# Patient Record
Sex: FEMALE | Race: OTHER | URBAN - METROPOLITAN AREA
[De-identification: names, ages, dates, MRNs, and addresses within clinical notes are randomized per-mention and may not be internally consistent; named-entity substitution may affect disease eponyms.]

---

## 2022-10-12 ENCOUNTER — HOSPITAL ENCOUNTER (OUTPATIENT)
Dept: LAB | Age: 27
Discharge: HOME | End: 2022-10-12

## 2022-10-12 ENCOUNTER — OFFICE VISIT (OUTPATIENT)
Dept: FAMILY MEDICINE CLINIC | Age: 27
End: 2022-10-12

## 2022-10-12 VITALS
WEIGHT: 114 LBS | BODY MASS INDEX: 20.2 KG/M2 | OXYGEN SATURATION: 99 % | HEIGHT: 63 IN | DIASTOLIC BLOOD PRESSURE: 75 MMHG | TEMPERATURE: 98.1 F | SYSTOLIC BLOOD PRESSURE: 115 MMHG | HEART RATE: 72 BPM

## 2022-10-12 DIAGNOSIS — R00.2 PALPITATIONS: Primary | ICD-10-CM

## 2022-10-12 DIAGNOSIS — F41.9 ANXIETY-LIKE SYMPTOMS: ICD-10-CM

## 2022-10-12 DIAGNOSIS — R00.2 PALPITATIONS: ICD-10-CM

## 2022-10-12 PROCEDURE — 84443 ASSAY THYROID STIM HORMONE: CPT

## 2022-10-12 PROCEDURE — 80048 BASIC METABOLIC PNL TOTAL CA: CPT

## 2022-10-12 PROCEDURE — 99203 OFFICE O/P NEW LOW 30 MIN: CPT | Performed by: PHYSICIAN ASSISTANT

## 2022-10-12 PROCEDURE — 36415 COLL VENOUS BLD VENIPUNCTURE: CPT

## 2022-10-12 PROCEDURE — 84439 ASSAY OF FREE THYROXINE: CPT

## 2022-10-12 RX ORDER — HYDROXYZINE PAMOATE 25 MG/1
25 CAPSULE ORAL
Qty: 30 CAPSULE | Refills: 0 | Status: SHIPPED | OUTPATIENT
Start: 2022-10-12 | End: 2022-10-26

## 2022-10-12 NOTE — PROGRESS NOTES
An After Visit Summary was printed and given to the patient. Instructed patient on how to administer medication per provider order. GoodRx provided to patient for needed medications. Instructed patient on how to use the coupon/card. Patient given a lab requisition to have labs drawn in [address].  Patient signed a KARLIE so provider can have access to patients PHI from [facility]

## 2022-10-12 NOTE — PROGRESS NOTES
Assessment/Plan:    Diagnoses and all orders for this visit:    1. Palpitations  -     TSH 3RD GENERATION; Future  -     T4, FREE; Future  -     METABOLIC PANEL, COMPREHENSIVE; Future    2. Anxiety-like symptoms  -     hydrOXYzine pamoate (VISTARIL) 25 mg capsule; Take 1 Capsule by mouth three (3) times daily as needed for Anxiety for up to 14 days. Record release [facility] ER end of 9/22  Labs  AVS  New med     Follow-up and Dispositions    Return in about 2 weeks (around 10/26/2022) for appt with me f2f please or vv if possible . Deanna Barry PA-C  [facility] expressed understanding of this plan. An AVS was printed and given to the patient.      ----------------------------------------------------------------------    Chief Complaint   Patient presents with    Palpitations     X 2 weeks       History of Present Illness:    New pt presents for 2 weeks of racing heart and high bp  She reports that she went to ER at [facility] about a week ago and they did EKG and labs and everything was normal  She reports no change in her psychosocial life in the last few months. She is a single mom of a 10 yo daughter. She is  from [place]. She is from [place] and her parents are there and this is hard but not a change. Her long term plan is to return to [place] in about 5 years  She has the implanon device and is not pregnant  She reports feeling like her heart is racing and difficult to breath about 2-5 times a day for the past 2 weeks. The walls feel like that they are closing in on her. She has to calm herself by breathing slowly. The sxs last only minutes and then resolve  She is sleeping well  She is not losing or gaining weight  Per her hx she does use recreational marijuana on a weekly basis     No past medical history on file.     Current Outpatient Medications   Medication Sig Dispense Refill    hydrOXYzine pamoate (VISTARIL) 25 mg capsule Take 1 Capsule by mouth three (3) times daily as needed for Anxiety for up to 14 days. 30 Capsule 0       No Known Allergies    Social History     Tobacco Use    Smoking status: Never    Smokeless tobacco: Never   Substance Use Topics    Alcohol use: Yes     Comment: social    Drug use: Yes     Frequency: 1.0 times per week     Types: Marijuana       No family history on file.     Physical Exam:     Visit Vitals  /75   Pulse 72   Temp 98.1 °F (36.7 °C) (Temporal)   Ht 5' 2.99\" (1.6 m)   Wt 114 lb (51.7 kg)   SpO2 99%   BMI 20.20 kg/m²       A&Ox3  WDWN NAD  Respirations normal and non labored  Cor RRR s1s2  Lungs CTA Danyel  Thyroid not enlarged

## 2022-10-13 LAB
ANION GAP SERPL CALC-SCNC: 7 MMOL/L (ref 5–15)
BUN SERPL-MCNC: 10 MG/DL (ref 6–20)
BUN/CREAT SERPL: 14 (ref 12–20)
CALCIUM SERPL-MCNC: 9.3 MG/DL (ref 8.5–10.1)
CHLORIDE SERPL-SCNC: 105 MMOL/L (ref 97–108)
CO2 SERPL-SCNC: 27 MMOL/L (ref 21–32)
CREAT SERPL-MCNC: 0.71 MG/DL (ref 0.55–1.02)
GLUCOSE SERPL-MCNC: 88 MG/DL (ref 65–100)
POTASSIUM SERPL-SCNC: 4.1 MMOL/L (ref 3.5–5.1)
SODIUM SERPL-SCNC: 139 MMOL/L (ref 136–145)
T4 FREE SERPL-MCNC: 1.1 NG/DL (ref 0.8–1.5)
TSH SERPL DL<=0.05 MIU/L-ACNC: 1.1 UIU/ML (ref 0.36–3.74)

## 2022-10-17 ENCOUNTER — TELEPHONE (OUTPATIENT)
Dept: FAMILY MEDICINE CLINIC | Age: 27
End: 2022-10-17

## 2022-10-17 NOTE — TELEPHONE ENCOUNTER
Tc to the pt with the  Akash Mcclure. The pt had called the [facility] main office and asked to speak with the provider as soon as possible. The pt verified er name and . The pt was asking for the provider to call her due to she is very concerned about her results from another hospital. She was told someone would call her with her results from the testing form when she went to the Hospital before coming to the [facility] last Wednesday. The pt stated she had very high blood pressures and she is worried. The stated she got the medication the provider ordered but it is not working well. Sat. Her BP was 132/96. Last night her BP was 126/87. We reviewed that if her BP was actually better than the one Sat night only slightly elevated. The medication order was reviewed with the pt that she could take the Hydroxyzine 25 mg, 1 tablet 3x a day, as needed, for anxiety. The pt stated she was not taking the medicine like this. She takes it as needed whenever she has anxiety and her heart rate is elevated. She took the medication last night and 2 hrs later her BP went up. The pt was advised to take the medication as prescribed. The pt was also advised to go to the ED if she had any chest pain or SOB, High BP. The pt stated she had a little chest pain last night when she had her elevated BP, and asked if she should just go to the ED first, instead of try to calm herself down. She was told no try to calm yourself down first, if the chest pain persists or gets worse go to the emergency room. The pt was informed this message would be sent to the provider. The provider was not in clinic today, but she will get the message. The pt verbalized understanding.  Leonora Sheppard RN

## 2022-10-17 NOTE — TELEPHONE ENCOUNTER
[employer] [employer] main office 10/17/22  requesting tot alk to provider MM as as soon is possible.   Thank you   Iveth Abebe

## 2022-10-19 ENCOUNTER — OFFICE VISIT (OUTPATIENT)
Dept: FAMILY MEDICINE CLINIC | Age: 27
End: 2022-10-19

## 2022-10-19 VITALS
HEIGHT: 63 IN | WEIGHT: 116 LBS | TEMPERATURE: 98.4 F | DIASTOLIC BLOOD PRESSURE: 66 MMHG | OXYGEN SATURATION: 100 % | BODY MASS INDEX: 20.55 KG/M2 | SYSTOLIC BLOOD PRESSURE: 99 MMHG | HEART RATE: 81 BPM

## 2022-10-19 DIAGNOSIS — R07.9 CHEST PAIN, UNSPECIFIED TYPE: Primary | ICD-10-CM

## 2022-10-19 PROCEDURE — 99213 OFFICE O/P EST LOW 20 MIN: CPT | Performed by: FAMILY MEDICINE

## 2022-10-19 RX ORDER — ASPIRIN 81 MG/1
81 TABLET ORAL DAILY
Qty: 90 TABLET | Refills: 2 | Status: SHIPPED | OUTPATIENT
Start: 2022-10-19 | End: 2022-10-26

## 2022-10-19 NOTE — PROGRESS NOTES
HISTORY OF PRESENT ILLNESS  Gagandeep Pfeiffer is a 24 y.o. female. HPI  Patient states last night she went to the ED because she was experiencing chest tightness, she has some shortness of breath. She had some tests yesterday and there was a test she had that was not within limits. She has palpitations. States her blood pressure stays elevated  Review of Systems   Constitutional:  Negative for chills, fever and weight loss. Respiratory:  Negative for cough, hemoptysis and sputum production. Cardiovascular:  Positive for chest pain and palpitations. Negative for orthopnea. Gastrointestinal:  Negative for abdominal pain, heartburn, nausea and vomiting. Genitourinary:  Negative for dysuria, frequency and urgency. Musculoskeletal:  Negative for back pain, myalgias and neck pain. Neurological:  Negative for dizziness, tingling, tremors and headaches. BP 99/66 (BP 1 Location: Left upper arm, BP Patient Position: Sitting)   Pulse 81   Temp 98.4 °F (36.9 °C) (Temporal)   Ht 5' 2.99\" (1.6 m)   Wt 116 lb (52.6 kg)   LMP 10/17/2022   SpO2 100%   BMI 20.55 kg/m²   Physical Exam  Constitutional:       General: She is not in acute distress. Appearance: She is not ill-appearing. HENT:      Head: Normocephalic. Right Ear: Tympanic membrane normal.      Left Ear: Tympanic membrane normal.   Eyes:      General:         Right eye: No discharge. Left eye: No discharge. Extraocular Movements: Extraocular movements intact. Pupils: Pupils are equal, round, and reactive to light. Cardiovascular:      Rate and Rhythm: Normal rate and regular rhythm. Pulses: Normal pulses. Heart sounds: Normal heart sounds. No murmur heard. Pulmonary:      Effort: Pulmonary effort is normal.      Breath sounds: Normal breath sounds. No wheezing, rhonchi or rales. Abdominal:      General: Bowel sounds are normal. There is no distension. Palpations: Abdomen is soft.       Tenderness: There is no abdominal tenderness. Hernia: No hernia is present. Musculoskeletal:         General: No swelling, tenderness or deformity. Normal range of motion. Cervical back: Normal range of motion. No rigidity or tenderness. Skin:     General: Skin is warm. Neurological:      General: No focal deficit present. Mental Status: She is alert. Cranial Nerves: No cranial nerve deficit. Motor: No weakness. Coordination: Coordination normal.       ASSESSMENT and PLAN  Diagnoses and all orders for this visit:    1. Chest pain, unspecified type  -     aspirin delayed-release 81 mg tablet; Take 1 Tablet by mouth daily. 24year old female who has been complaining of chest pains,  last night visited the ED and had labs,  she shows me a positive D-Dimer in her phone,  she then had a CT scan but the results we don't have. Risk factors for coagulation disorder include nexplanon and remote history of smoking (denies at this time). Patient states she was told the results were negative.   Vital signs are stable today,  chest pain is not reproducible,  she is not having palpitations today, pulse ox is normal.  We will continue a aspirin 81 mg and request records from ED  Follow up in 1 week

## 2022-10-19 NOTE — PROGRESS NOTES
Coordination of Care  1. Have you been to the ER, urgent care clinic since your last visit? Hospitalized since your last visit? Yes, [facility],  chest pain    2. Have you seen or consulted any other health care providers outside of the [address] [place] [address] since your last visit? Include any pap smears or colon screening. No    Does the patient need refills? NO    Learning Assessment Complete? yes  Depression Screening complete in the past 12 months? yes      Patient reports that the D-dimer and microalbumin test was abnormal at ED.

## 2022-10-19 NOTE — PROGRESS NOTES
An After Visit Summary was printed and given to the patient. Patient given instructions to buy Aspirin delayed-release 81mg and take one daily per MD orders and to return in one week. Patient verbalized understanding.  Release of records form faxed to Emi Kebede RN

## 2022-10-26 ENCOUNTER — OFFICE VISIT (OUTPATIENT)
Dept: FAMILY MEDICINE CLINIC | Age: 27
End: 2022-10-26

## 2022-10-26 VITALS
WEIGHT: 118 LBS | DIASTOLIC BLOOD PRESSURE: 66 MMHG | HEART RATE: 79 BPM | BODY MASS INDEX: 20.91 KG/M2 | HEIGHT: 63 IN | SYSTOLIC BLOOD PRESSURE: 113 MMHG | TEMPERATURE: 98.6 F | OXYGEN SATURATION: 100 %

## 2022-10-26 DIAGNOSIS — Z23 ENCOUNTER FOR IMMUNIZATION: Primary | ICD-10-CM

## 2022-10-26 PROCEDURE — 99213 OFFICE O/P EST LOW 20 MIN: CPT | Performed by: FAMILY MEDICINE

## 2022-10-26 PROCEDURE — 90686 IIV4 VACC NO PRSV 0.5 ML IM: CPT

## 2022-10-26 PROCEDURE — 90471 IMMUNIZATION ADMIN: CPT

## 2022-10-26 NOTE — PROGRESS NOTES
*ATTENTION:  This note has been created by a medical student for educational purposes only. Please do not refer to the content of this note for clinical decision-making, billing, or other purposes. Please see attending physicians note to obtain clinical information on this patient. *     Jefferson Cabrera is a 24 y.o. female. Taking the aspirin. Had some palpitations on Sunday. Discussed the normal results of the CT scan without any signs of PE. She denies any current chest pain, tightness, shortness of breath, leg pain or swelling. Does say that she has had issues with her blood pressure getting high sometimes. Currently has a nexplanon in place but would like to have it removed because she is worried this could be the cause of her symptoms. Review of Systems   Constitutional:  Negative for fever and weight loss. Respiratory:  Negative for shortness of breath. Cardiovascular:  Positive for palpitations. Negative for chest pain and leg swelling. Gastrointestinal:  Negative for abdominal pain. Neurological:  Negative for dizziness and headaches. Objective  Visit Vitals  /66 (BP 1 Location: Left upper arm, BP Patient Position: Sitting)   Pulse 79   Temp 98.6 °F (37 °C) (Temporal)   Ht 5' 2.99\" (1.6 m)   Wt 118 lb (53.5 kg)   LMP 10/17/2022   SpO2 100%   BMI 20.91 kg/m²     Physical Exam  Constitutional:       Appearance: Normal appearance. She is normal weight. Eyes:      Extraocular Movements: Extraocular movements intact. Conjunctiva/sclera: Conjunctivae normal.   Cardiovascular:      Rate and Rhythm: Normal rate and regular rhythm. Heart sounds: No murmur heard. No friction rub. No gallop. Pulmonary:      Effort: Pulmonary effort is normal.      Breath sounds: Normal breath sounds. No stridor. No wheezing or rhonchi. Abdominal:      General: Abdomen is flat. Palpations: Abdomen is soft. Musculoskeletal:         General: Normal range of motion. Skin:     General: Skin is warm and dry. Neurological:      Mental Status: She is alert. Assessment & Plan  Diagnoses and all orders for this visit:    1. Encounter for immunization  -     INFLUENZA, FLUARIX, FLULAVAL, FLUZONE (AGE 6 MO+), AFLURIA(AGE 3Y+) IM, PF, 0.5 ML    26yo with no past medical history presenting for follow up after a trip to the ED with acute chest tightness and SOB with + D dimer. Since last visit CT read as normal without signs of PE. Other labs were all normal. Discussed this with the patient today and went over other causes of intermittent tachycardia including stress and idiopathic.  Pt would still like and appt to remove nexplanon.  -appt with Dr. Diallo Cortez for nexplanon removal  -flu shot today  -follow up PRN  [facility]

## 2022-10-26 NOTE — PROGRESS NOTES
Hope Muñoz is a 24 y.o. female. Taking the aspirin. Had some palpitations on Sunday. Discussed the normal results of the CT scan without any signs of PE. She denies any current chest pain, tightness, shortness of breath, leg pain or swelling. Does say that she has had issues with her blood pressure getting high sometimes. Currently has a nexplanon in place but would like to have it removed because she is worried this could be the cause of her symptoms. Review of Systems   Constitutional:  Negative for fever and weight loss. Respiratory:  Negative for shortness of breath. Cardiovascular:  Positive for palpitations. Negative for chest pain and leg swelling. Gastrointestinal:  Negative for abdominal pain. Neurological:  Negative for dizziness and headaches. Objective  Visit Vitals  /66 (BP 1 Location: Left upper arm, BP Patient Position: Sitting)   Pulse 79   Temp 98.6 °F (37 °C) (Temporal)   Ht 5' 2.99\" (1.6 m)   Wt 118 lb (53.5 kg)   LMP 10/17/2022   SpO2 100%   BMI 20.91 kg/m²      Physical Exam  Constitutional:       Appearance: Normal appearance. She is normal weight. Eyes:      Extraocular Movements: Extraocular movements intact. Conjunctiva/sclera: Conjunctivae normal.   Cardiovascular:      Rate and Rhythm: Normal rate and regular rhythm. Heart sounds: No murmur heard. No friction rub. No gallop. Pulmonary:      Effort: Pulmonary effort is normal.      Breath sounds: Normal breath sounds. No stridor. No wheezing or rhonchi. Abdominal:      General: Abdomen is flat. Palpations: Abdomen is soft. Musculoskeletal:         General: Normal range of motion. Skin:     General: Skin is warm and dry. Neurological:      Mental Status: She is alert. Assessment & Plan  Diagnoses and all orders for this visit:     1.  Encounter for immunization  -     INFLUENZA, FLUARIX, FLULAVAL, FLUZONE (AGE 6 MO+), AFLURIA(AGE 3Y+) IM, PF, 0.5 ML     26yo with no past medical history presenting for follow up after a trip to the ED with acute chest tightness and SOB with + D dimer. Since last visit CT read as normal without signs of PE. Other labs were all normal. Discussed this with the patient today and went over other causes of intermittent tachycardia including stress and idiopathic.  Pt would still like and appt to remove nexplanon.  -appt with Dr. Zak Anderson for nexplanon removal  -flu shot today  -follow up PRN

## 2022-10-26 NOTE — PROGRESS NOTES
I have printed AVS and reviewed it with patient today. Patient verbalized understanding. I instructed patient to schedule a follow-up appointment with the provider prior to leaving today. Patient verbalized understanding. Patient correctly stated her full name and date of birth prior to the information shared.  [id number] with the [employer] [facility] assisted with this discharge.   Mikey Calderon RN

## 2022-10-26 NOTE — PROGRESS NOTES
Coordination of Care  1. Have you been to the ER, urgent care clinic since your last visit? Hospitalized since your last visit? No    2. Have you seen or consulted any other health care providers outside of the [address] [place] [address] since your last visit? Include any pap smears or colon screening. No    Does the patient need refills? NO    Learning Assessment Complete?  yes  Depression Screening complete in the past 12 months? yes

## 2022-11-11 ENCOUNTER — OFFICE VISIT (OUTPATIENT)
Dept: FAMILY MEDICINE CLINIC | Age: 27
End: 2022-11-11

## 2022-11-11 VITALS
HEART RATE: 90 BPM | DIASTOLIC BLOOD PRESSURE: 68 MMHG | BODY MASS INDEX: 21.26 KG/M2 | OXYGEN SATURATION: 99 % | TEMPERATURE: 99 F | WEIGHT: 120 LBS | HEIGHT: 63 IN | SYSTOLIC BLOOD PRESSURE: 106 MMHG

## 2022-11-11 DIAGNOSIS — H53.8 BLURRED VISION: ICD-10-CM

## 2022-11-11 DIAGNOSIS — Z30.46 NEXPLANON REMOVAL: Primary | ICD-10-CM

## 2022-11-11 DIAGNOSIS — K21.9 GASTROESOPHAGEAL REFLUX DISEASE WITHOUT ESOPHAGITIS: ICD-10-CM

## 2022-11-11 PROCEDURE — 99214 OFFICE O/P EST MOD 30 MIN: CPT | Performed by: FAMILY MEDICINE

## 2022-11-11 PROCEDURE — 11982 REMOVE DRUG IMPLANT DEVICE: CPT | Performed by: FAMILY MEDICINE

## 2022-11-11 RX ORDER — PANTOPRAZOLE SODIUM 40 MG/1
40 TABLET, DELAYED RELEASE ORAL DAILY
Qty: 30 TABLET | Refills: 1 | Status: SHIPPED | OUTPATIENT
Start: 2022-11-11

## 2022-11-11 RX ORDER — LEVONORGESTREL AND ETHINYL ESTRADIOL 0.1-0.02MG
1 KIT ORAL DAILY
Qty: 3 EACH | Refills: 3 | Status: SHIPPED | OUTPATIENT
Start: 2022-11-11

## 2022-11-11 NOTE — PROGRESS NOTES
Coordination of Care  1. Have you been to the ER, urgent care clinic since your last visit? Hospitalized since your last visit? No    2. Have you seen or consulted any other health care providers outside of the [address] [place] [address] since your last visit? Include any pap smears or colon screening. 11/9/22 Constance Glover    Does the patient need refills? N/A    Learning Assessment Complete?  yes

## 2022-11-11 NOTE — PROGRESS NOTES
[facility] seen at d/c, full name and  verified. After Visit Summary provided and reviewed along with discharge instructions and when it is recommended to come back. Reviewed medication list with the patient to ensure she knows how to and when to take her medications. Side effects, mechanisms of action and medication compliance were reiterated to ensure proper understanding. GoodRx coupon was provided. A list of vision resources was given to the patient for her to establish care with a provider. Advised patient to take off outer bandage in 12 hours and to return for care if she experiences any signs of infection such as fever, red/hot at site or excessive pain or bleeding. Time for questions and answers provided, patient verbalizes understanding.

## 2022-11-11 NOTE — PROGRESS NOTES
Fredo Smith is a 24 y.o. female   Chief Complaint   Patient presents with    Implanon Removal    GERD    Visual Problems         ASSESSMENT AND PLAN:    1. Nexplanon removal  Successful removal of intact subdermal contraceptive implant. Care and return precautions reviewed. - levonorgestrel-ethinyl estradiol (AVIANE, ALESSE, LESSINA) 0.1-20 mg-mcg tab; Take 1 Tablet by mouth daily. Dispense: 3 Each; Refill: 3  - REMOVAL DRUG IMPLANT DEVICE    2. Gastroesophageal reflux disease without esophagitis  Dietary modification. PPI. - pantoprazole (PROTONIX) 40 mg tablet; Take 1 Tablet by mouth daily. Indications: gastroesophageal reflux disease  Dispense: 30 Tablet; Refill: 1    3. Blurred vision  Recommend eye exam. List of optometrists given. SUBJECTIVE:    HPI:  Fredo Smith is a 24 y.o. female who presents for subdermal contraceptive implant removal. She has had it for about 2 years, but has had headaches, palpitations, \"hormonal dysregulation\", and general malaise with it and would like it removed. Additionally she has been struggling with reflux. She went to the ED earlier this week because she had a severe headache that wouldn't go away. She was prescribed loratadine and phenergan and has had improvement. She notes blurry vision and difficulty seeing distances. Has not had an eye exam.    Every so often she wakes up with left eye inflammation, itchiness and grainy discharge. It resolves on it's own. She denies seasonal allergies. She does not have pets. She has not noticed. It does not affect her vision. Review of Systems   Constitutional:  Negative for fever and malaise/fatigue. Eyes:  Positive for blurred vision. Respiratory:  Negative for cough and shortness of breath. Cardiovascular:  Negative for chest pain, palpitations and leg swelling. Gastrointestinal:  Positive for abdominal pain, heartburn and nausea. Negative for constipation, diarrhea and vomiting. Neurological:  Positive for dizziness and headaches. /68   Pulse 90   Temp 99 °F (37.2 °C) (Temporal)   Ht 5' 2.99\" (1.6 m)   Wt 120 lb (54.4 kg)   LMP 10/17/2022   SpO2 99%   BMI 21.26 kg/m²     Physical Exam  Constitutional:       General: She is not in acute distress. Appearance: Normal appearance. Eyes:      General:         Right eye: No discharge. Left eye: No discharge. Extraocular Movements: Extraocular movements intact. Conjunctiva/sclera: Conjunctivae normal.      Pupils: Pupils are equal, round, and reactive to light. Cardiovascular:      Rate and Rhythm: Normal rate and regular rhythm. Heart sounds: Normal heart sounds. Pulmonary:      Effort: Pulmonary effort is normal.      Breath sounds: Normal breath sounds. Abdominal:      Tenderness: There is abdominal tenderness (epigastric). There is no right CVA tenderness or left CVA tenderness. Skin:     Comments: Nexplanon palpated in left upper extremity   Neurological:      Mental Status: She is alert. NEXPLANON PROCEDURE NOTE:    Anesthesia: 1% Lidocaine w/ epinephrine 5 ml   Procedure: Consent obtained. A time-out was performed prior to  initiating procedure to be sure of right patient and right location. The  area surrounding the Nexplanon was prepared with Choloraprep and draped  in the usual sterile manner. The site was anesthetized with 5ccs of 1% lidocaine with epi. A skin incision was made over the distal aspect of the device. The  capsule lysed sharply and the device removed using a hemostat. Hemostasis was assured. The site was dressed with SteriStrips and a pressure dressing. The patient tolerated the procedure  well. Followup: The patient tolerated the procedure well without  complications. Standard post-procedure care is explained and return  precautions are given. Contraception is advised until conception is  desired.

## 2023-12-19 ENCOUNTER — NURSE ONLY (OUTPATIENT)
Age: 28
End: 2023-12-19

## 2023-12-19 DIAGNOSIS — R07.89 OTHER CHEST PAIN: ICD-10-CM

## 2024-02-16 ENCOUNTER — OFFICE VISIT (OUTPATIENT)
Age: 29
End: 2024-02-16

## 2024-02-16 VITALS
DIASTOLIC BLOOD PRESSURE: 72 MMHG | HEART RATE: 78 BPM | BODY MASS INDEX: 22.32 KG/M2 | TEMPERATURE: 98 F | SYSTOLIC BLOOD PRESSURE: 109 MMHG | WEIGHT: 126 LBS | HEIGHT: 63 IN

## 2024-02-16 DIAGNOSIS — Z86.19 HISTORY OF HELICOBACTER PYLORI INFECTION: Primary | ICD-10-CM

## 2024-02-16 DIAGNOSIS — R07.89 OTHER CHEST PAIN: ICD-10-CM

## 2024-02-16 DIAGNOSIS — K64.9 HEMORRHOIDS, UNSPECIFIED HEMORRHOID TYPE: ICD-10-CM

## 2024-02-16 PROCEDURE — 99214 OFFICE O/P EST MOD 30 MIN: CPT | Performed by: FAMILY MEDICINE

## 2024-02-16 RX ORDER — GLYCERIN, PETROLATUM, PHENYLEPHRINE HCL, PRAMOXINE HCL 144; 2.5; 10; 15 MG/G; MG/G; MG/G; MG/G
CREAM TOPICAL
Qty: 1 EACH | Refills: 0 | Status: SHIPPED | OUTPATIENT
Start: 2024-02-16

## 2024-02-16 RX ORDER — HYDROCORTISONE 25 MG/G
CREAM TOPICAL
Qty: 1 EACH | Refills: 0 | Status: SHIPPED | OUTPATIENT
Start: 2024-02-16

## 2024-02-16 NOTE — PROGRESS NOTES
Ronda Elliott is a 28 y.o. female   Chief Complaint   Patient presents with   • Other     Chest pain         ASSESSMENT AND PLAN:    1. History of Helicobacter pylori infection  Test of cure, daughter will get a test as well since both mom and dad had H Pylori.  Can take Tums for reflux symptoms.  - H. Pylori Antigen, Stool; Future    2. Other chest pain  Continue to monitor.  Can trial acetaminophen if it continues.    3. Hemorrhoids, unspecified hemorrhoid type  - hydrocortisone (ANUSOL-HC) 2.5 % CREA rectal cream; Apply to anus daily for hemorrhoids.  Dispense: 1 each; Refill: 0  - pramox-PE-glycerin-petrolatum (PREPARATION H) 1-0.25-14.4-15 % cream; Apply to anus BID PRN for painful hemorrhoids.  Dispense: 1 each; Refill: 0    > Asked pt to take picture of hands when they next get red.  Her CBC was normal so I don't suspect PCV or myeloproliferative disease. Not associated with temperature so not likely Raynaud's.  Not painful so not likely erythromelalgia.    SUBJECTIVE:    HPI:  Ronda Elliott is a 28 y.o. female who presents for followup on Chest pain. On her workup she was found to have H Pylori and was treated.    She hasn't had the chest pain for a long time.    However, she was doing a lot of physical activity and noticed some chest pain yesterday and today.  It's higher on her sternum than previously.   At about 10-11 today she had it. None currently. No pain with palpation.  This past week she did have a little bit of reflux.    Hands get very red from her wrists down.  They get a little numbness/tingling, but no pain or itching.  It lasts about an hour.  No provoking factors. Always at night.     She gets pain from varicose veins occasionally. Spends long time on her feet at work.    Hemorrhoids - painful, sometimes bleed.  Occasional constipation.    Had only 1-2 days of bleeding after missing period for 3 months.   Did several negative home pregnancy tests. On COCs.    Occasional pain under

## 2024-02-16 NOTE — PROGRESS NOTES
Ronda Elliott seen at discharge. Full name and  verified; After visit Summary was given. RN reviewed today's visit with patient, as well as instructions on when it is recommended to return for follow-up visit as needed. RN reviewed the provider's instructions with the patient, answering all questions to her satisfaction. Patient verbalized understanding. H. Pylori testing kit provided to patient, label placed on collection tube, patient to collect and instructed patient to write in date that sample was collected on the label with  and full name, full instructions were reviewed with patient along with contents of kit and how to pack the sample, expressed to pt importance of dropping it off asap. Address to our [facility] given to patient. Patient advised to drop off specimen Monday-Friday from 8:30am-3pm. Patient verbalized understanding.  Due to language barrier, an  (Lucho) assisted during this encounter.   Kelli Atkinson RN

## 2024-04-02 ENCOUNTER — TELEPHONE (OUTPATIENT)
Age: 29
End: 2024-04-02

## 2024-04-02 NOTE — TELEPHONE ENCOUNTER
Patient is calling to request if she could do a lab drop off at one of our mobile locations. According to the patient she can not make it to the [facility] due to work but she could make it to one of the mobile sites. If not would she would like to have a family member drop it off for her.

## 2024-04-02 NOTE — TELEPHONE ENCOUNTER
Tc to the pt 2x. No answer. No vm message could be left. Automated message stated the wireless customer you are trying to reach is unavailable at the moment. The message to the pt was to be that the only day the pt may bring the specimen to the mobile site would be today, to [facility] per the PSR. Due to the [facility] is closed today. Otherwise the pt would have to return the specimen to the [facility]. Suzanne Acuña RN

## 2024-04-03 ENCOUNTER — HOSPITAL ENCOUNTER (OUTPATIENT)
Facility: HOSPITAL | Age: 29
Setting detail: SPECIMEN
Discharge: HOME | End: 2024-04-06

## 2024-04-03 ENCOUNTER — NURSE ONLY (OUTPATIENT)
Age: 29
End: 2024-04-03

## 2024-04-03 DIAGNOSIS — Z86.19 HISTORY OF HELICOBACTER PYLORI INFECTION: ICD-10-CM

## 2024-04-03 PROCEDURE — 87338 HPYLORI STOOL AG IA: CPT

## 2024-04-06 LAB
H PYLORI AG STL QL IA: NEGATIVE
SPECIMEN SOURCE: NORMAL

## 2025-03-14 ENCOUNTER — HOSPITAL ENCOUNTER (EMERGENCY)
Facility: HOSPITAL | Age: 30
Discharge: HOME | End: 2025-03-14
Attending: STUDENT IN AN ORGANIZED HEALTH CARE EDUCATION/TRAINING PROGRAM

## 2025-03-14 ENCOUNTER — APPOINTMENT (OUTPATIENT)
Facility: HOSPITAL | Age: 30
End: 2025-03-14

## 2025-03-14 VITALS
SYSTOLIC BLOOD PRESSURE: 123 MMHG | DIASTOLIC BLOOD PRESSURE: 85 MMHG | RESPIRATION RATE: 17 BRPM | HEART RATE: 90 BPM | BODY MASS INDEX: 20.94 KG/M2 | OXYGEN SATURATION: 100 % | HEIGHT: 65 IN | TEMPERATURE: 99.1 F | WEIGHT: 125.66 LBS

## 2025-03-14 DIAGNOSIS — O01.9 HYDATIDIFORM MOLE, UNSPECIFIED HYDATIDIFORM MOLE TYPE: Primary | ICD-10-CM

## 2025-03-14 LAB
ABO + RH BLD: NORMAL
ALBUMIN SERPL-MCNC: 3.9 G/DL (ref 3.5–5.2)
ALBUMIN/GLOB SERPL: 1.2 (ref 1.1–2.2)
ALP SERPL-CCNC: 59 U/L (ref 35–104)
ALT SERPL-CCNC: 38 U/L (ref 10–35)
ANION GAP SERPL CALC-SCNC: 12 MMOL/L (ref 2–12)
APPEARANCE UR: ABNORMAL
AST SERPL-CCNC: 25 U/L (ref 10–35)
BACTERIA URNS QL MICRO: ABNORMAL /HPF
BASOPHILS # BLD: 0.04 K/UL (ref 0–0.1)
BASOPHILS NFR BLD: 0.5 % (ref 0–1)
BILIRUB SERPL-MCNC: <0.2 MG/DL (ref 0.2–1)
BILIRUB UR QL: NEGATIVE
BLOOD BANK CMNT PATIENT-IMP: NORMAL
BUN SERPL-MCNC: 11 MG/DL (ref 6–20)
BUN/CREAT SERPL: ABNORMAL (ref 12–20)
CALCIUM SERPL-MCNC: 8.7 MG/DL (ref 8.6–10)
CHLORIDE SERPL-SCNC: 103 MMOL/L (ref 98–107)
CO2 SERPL-SCNC: 22 MMOL/L (ref 22–29)
COLOR UR: ABNORMAL
CREAT SERPL-MCNC: <0.47 MG/DL (ref 0.5–0.9)
DIFFERENTIAL METHOD BLD: ABNORMAL
EOSINOPHIL # BLD: 0.11 K/UL (ref 0–0.4)
EOSINOPHIL NFR BLD: 1.3 % (ref 0–7)
EPITH CASTS URNS QL MICRO: ABNORMAL /LPF
ERYTHROCYTE [DISTWIDTH] IN BLOOD BY AUTOMATED COUNT: 12.9 % (ref 11.5–14.5)
GLOBULIN SER CALC-MCNC: 3.2 G/DL (ref 2–4)
GLUCOSE SERPL-MCNC: 84 MG/DL (ref 65–100)
GLUCOSE UR STRIP.AUTO-MCNC: NEGATIVE MG/DL
HCG SERPL-ACNC: ABNORMAL MIU/ML
HCG UR QL: POSITIVE
HCT VFR BLD AUTO: 32.3 % (ref 35–47)
HGB BLD-MCNC: 11 G/DL (ref 11.5–16)
HGB UR QL STRIP: ABNORMAL
IMM GRANULOCYTES # BLD AUTO: 0.03 K/UL (ref 0–0.04)
IMM GRANULOCYTES NFR BLD AUTO: 0.4 % (ref 0–0.5)
KETONES UR QL STRIP.AUTO: NEGATIVE MG/DL
LEUKOCYTE ESTERASE UR QL STRIP.AUTO: ABNORMAL
LIPASE SERPL-CCNC: 31 U/L (ref 13–60)
LYMPHOCYTES # BLD: 2.32 K/UL (ref 0.8–3.5)
LYMPHOCYTES NFR BLD: 27.3 % (ref 12–49)
MCH RBC QN AUTO: 27.8 PG (ref 26–34)
MCHC RBC AUTO-ENTMCNC: 34.1 G/DL (ref 30–36.5)
MCV RBC AUTO: 81.8 FL (ref 80–99)
MONOCYTES # BLD: 0.64 K/UL (ref 0–1)
MONOCYTES NFR BLD: 7.5 % (ref 5–13)
NEUTS SEG # BLD: 5.35 K/UL (ref 1.8–8)
NEUTS SEG NFR BLD: 63 % (ref 32–75)
NITRITE UR QL STRIP.AUTO: NEGATIVE
NRBC # BLD: 0 K/UL (ref 0–0.01)
NRBC BLD-RTO: 0 PER 100 WBC
PH UR STRIP: 7 (ref 5–8)
PLATELET # BLD AUTO: 218 K/UL (ref 150–400)
PMV BLD AUTO: 10.3 FL (ref 8.9–12.9)
POTASSIUM SERPL-SCNC: 4.2 MMOL/L (ref 3.5–5.1)
PROT SERPL-MCNC: 7.1 G/DL (ref 6.4–8.3)
PROT UR STRIP-MCNC: NEGATIVE MG/DL
RBC # BLD AUTO: 3.95 M/UL (ref 3.8–5.2)
RBC #/AREA URNS HPF: ABNORMAL /HPF
SODIUM SERPL-SCNC: 137 MMOL/L (ref 136–145)
SP GR UR REFRACTOMETRY: 1.02 (ref 1–1.03)
TSH SERPL DL<=0.05 MIU/L-ACNC: 0.15 UIU/ML (ref 0.27–4.2)
UROBILINOGEN UR QL STRIP.AUTO: 0.2 EU/DL (ref 0.2–1)
WBC # BLD AUTO: 8.5 K/UL (ref 3.6–11)
WBC URNS QL MICRO: ABNORMAL /HPF (ref 0–4)

## 2025-03-14 PROCEDURE — 76801 OB US < 14 WKS SINGLE FETUS: CPT

## 2025-03-14 PROCEDURE — 6360000002 HC RX W HCPCS: Performed by: STUDENT IN AN ORGANIZED HEALTH CARE EDUCATION/TRAINING PROGRAM

## 2025-03-14 PROCEDURE — 76817 TRANSVAGINAL US OBSTETRIC: CPT

## 2025-03-14 PROCEDURE — 86901 BLOOD TYPING SEROLOGIC RH(D): CPT

## 2025-03-14 PROCEDURE — 84443 ASSAY THYROID STIM HORMONE: CPT

## 2025-03-14 PROCEDURE — 80053 COMPREHEN METABOLIC PANEL: CPT

## 2025-03-14 PROCEDURE — 81001 URINALYSIS AUTO W/SCOPE: CPT

## 2025-03-14 PROCEDURE — 99284 EMERGENCY DEPT VISIT MOD MDM: CPT

## 2025-03-14 PROCEDURE — 96372 THER/PROPH/DIAG INJ SC/IM: CPT

## 2025-03-14 PROCEDURE — 86900 BLOOD TYPING SEROLOGIC ABO: CPT

## 2025-03-14 PROCEDURE — 81025 URINE PREGNANCY TEST: CPT

## 2025-03-14 PROCEDURE — 85025 COMPLETE CBC W/AUTO DIFF WBC: CPT

## 2025-03-14 PROCEDURE — 36415 COLL VENOUS BLD VENIPUNCTURE: CPT

## 2025-03-14 PROCEDURE — 71046 X-RAY EXAM CHEST 2 VIEWS: CPT

## 2025-03-14 PROCEDURE — 84439 ASSAY OF FREE THYROXINE: CPT

## 2025-03-14 PROCEDURE — 83690 ASSAY OF LIPASE: CPT

## 2025-03-14 PROCEDURE — 84702 CHORIONIC GONADOTROPIN TEST: CPT

## 2025-03-14 RX ADMIN — HUMAN RHO(D) IMMUNE GLOBULIN 300 MCG: 300 INJECTION, SOLUTION INTRAMUSCULAR at 23:44

## 2025-03-14 ASSESSMENT — PAIN - FUNCTIONAL ASSESSMENT: PAIN_FUNCTIONAL_ASSESSMENT: 0-10

## 2025-03-14 ASSESSMENT — LIFESTYLE VARIABLES
HOW OFTEN DO YOU HAVE A DRINK CONTAINING ALCOHOL: NEVER
HOW MANY STANDARD DRINKS CONTAINING ALCOHOL DO YOU HAVE ON A TYPICAL DAY: PATIENT DOES NOT DRINK

## 2025-03-14 ASSESSMENT — ENCOUNTER SYMPTOMS
SORE THROAT: 0
VOMITING: 0
NAUSEA: 1
ABDOMINAL PAIN: 1
COUGH: 0
DIARRHEA: 0
SHORTNESS OF BREATH: 0
EYE PAIN: 0

## 2025-03-14 ASSESSMENT — PAIN SCALES - GENERAL: PAINLEVEL_OUTOF10: 0

## 2025-03-14 NOTE — ED TRIAGE NOTES
Session code: 55088    Pt reports to ED w/ cc of stomach pain and bloating w/ nausea that has been ongoing for three weeks. States she vomits in the morning and after she eats. Denies pregnancy.

## 2025-03-14 NOTE — ED PROVIDER NOTES
[place] [facility] DEPARTMENT  EMERGENCY DEPARTMENT ENCOUNTER      Pt Name: Ronda Elliott  MRN: [id number]  Birthdate 1995  Date of evaluation: 3/14/2025  Provider: DESIRE GATES    CHIEF COMPLAINT       Chief Complaint   Patient presents with    Abdominal Pain         HISTORY OF PRESENT ILLNESS   (Location/Symptom, Timing/Onset, Context/Setting, Quality, Duration, Modifying Factors, Severity)  Note limiting factors.   29-year-old female presents ED with lower abdominal pain.  Patient reports that for the past 3 weeks she has had intermittent abdominal pain as well as nausea especially in the morning.  She reports the pain seems to come and go without pattern but the nausea is consistent after she tries to eat or drink something.  She notes around the time that this started she had some vaginal bleeding consistent with her menstrual cycle.  However, she is not sure if this was actually her menstrual cycle as she reports that her menstrual cycles are very irregular ever since having the Implanon 2 years ago which has since been removed.  She reports that she does oral contraceptives pills for pregnancy prevention.  Otherwise denies any vomiting, diarrhea, chest pain, shortness of breath, dysuria or urinary frequency    The history is provided by the patient. The history is limited by a language barrier. A  was used.         Review of External Medical Records:     Nursing Notes were reviewed.    REVIEW OF SYSTEMS    (2-9 systems for level 4, 10 or more for level 5)     Review of Systems   Constitutional:  Negative for chills and fever.   HENT:  Negative for congestion, ear pain and sore throat.    Eyes:  Negative for pain.   Respiratory:  Negative for cough and shortness of breath.    Cardiovascular:  Negative for chest pain.   Gastrointestinal:  Positive for abdominal pain and nausea. Negative for diarrhea and vomiting.   Genitourinary:  Negative for dysuria and flank pain.  99.1 °F (37.3 °C) Oral 87 17 100 %      Height Weight - Scale         1.64 m (5' 4.57\") 57 kg (125 lb 10.6 oz)             Body mass index is 21.19 kg/m².    Physical Exam  Vitals and nursing note reviewed.   Constitutional:       General: She is not in acute distress.     Appearance: Normal appearance.   Eyes:      Extraocular Movements: Extraocular movements intact.      Pupils: Pupils are equal, round, and reactive to light.   Cardiovascular:      Rate and Rhythm: Normal rate and regular rhythm.      Heart sounds: Normal heart sounds.   Pulmonary:      Effort: Pulmonary effort is normal.      Breath sounds: Normal breath sounds.   Abdominal:      General: Bowel sounds are normal.      Palpations: Abdomen is soft.      Tenderness: There is no abdominal tenderness.   Skin:     General: Skin is warm and dry.   Neurological:      General: No focal deficit present.      Mental Status: She is alert and oriented to person, place, and time.   Psychiatric:         Mood and Affect: Mood normal.         Behavior: Behavior normal.         Thought Content: Thought content normal.         DIAGNOSTIC RESULTS     EKG: All EKG's are interpreted by the Emergency Department Physician who either signs or Co-signs this chart in the absence of a cardiologist.        RADIOLOGY:   Non-plain film images such as CT, Ultrasound and MRI are read by the radiologist. Plain radiographic images are visualized and preliminarily interpreted by the emergency physician with the below findings:        Interpretation per the Radiologist below, if available at the time of this note:    XR CHEST (2 VW)   Final Result   No acute cardiopulmonary findings.               Electronically signed by ARNIE GORMAN OB LESS THAN 14 WEEKS SINGLE OR FIRST GESTATION   Final Result   Findings compatible with complete hydatidiform mole. No pelvic free fluid.      The findings were called to DESIRE Garcia on  3/14/2025 at 2030 hours EST by Dr. Irving. She was

## 2025-03-15 LAB — T4 FREE SERPL-MCNC: 1.4 NG/DL (ref 0.8–1.5)

## 2025-03-15 NOTE — ED NOTES
Bedside and Verbal shift change report given to Kathryn RN (oncoming nurse) by Ame RN (offgoing nurse). Report included the following information Nurse Handoff Report, ED Encounter Summary, ED SBAR, and Recent Results.

## 2025-03-15 NOTE — ED NOTES
Patient discharged to follow up with GYN clinic ambulatory care building at [facility] on Wednesday at 9:45. Patient to return to ED with worsening pain or bleeding.  #[id number] used for discharge. Patient and partner verbalized understanding and states they have no further questions or concerns

## 2025-03-15 NOTE — DISCHARGE INSTRUCTIONS
You have been diagnosed with a molar pregnancy.  This is an unviable type of pregnancy that if left untreated can be very dangerous.  We have talked to various specialists and they have recommended that you follow-up at [facility] on Wednesday, 03/19.  They have already made this appointment for you.    Your appointment will be with Dr. Bina Moss.  She is located at the [facility] ambulatory care center. Their address is [address] [place] [address].  The office is on the 10th floor.  Your appointment is on Wednesday the 19th at 9:45 AM.  They will likely call you on Monday with information.    Over the weekend, if you experience any worsening pain, fevers, vomiting or heavy vaginal bleeding (feeling up more than 2 menstrual pads in 1 hour), please return to the ER.  Otherwise you can monitor your symptoms, take Motrin Tylenol as needed for pain and rest.    Le hook diagnosticado un embarazo molar.  Laure es un tipo de embarazo inviable que si no se trata puede resultar muy peligroso.  Hemos hablado con varios especialistas y le hook recomendado que realice un seguimiento en [facility] el miércoles 19 de marzo.  Ya te hook concertado esta hellen.    Rivera hellen será con la Dra. Bina Moss.  Está ubicada en el centro de atención ambulatoria de [facility]. Rivera dirección es [address] [place] [address]. La oficina está en el décimo piso.  Tu hellen es el miércoles 19 a las 9:45 am.  Probablemente te llamen el lunes con información.    Hugh el fin de semana, si experimenta algún dolor que empeora, fiebre, vómitos o sangrado vaginal abundante (sentir más de 2 toallas sanitarias menstruales en 1 hora), regrese a la samir de emergencias.  De lo contrario, puede controlar eulalia síntomas, umu Motrin Tylenol según sea necesario para el dolor y descansar.

## 2025-03-16 LAB
BLD PROD TYP BPU: NORMAL
BLOOD BANK BLOOD PRODUCT EXPIRATION DATE: NORMAL
BLOOD BANK DISPENSE STATUS: NORMAL
BPU ID: NORMAL
UNIT DIVISION: 0
UNIT ISSUE DATE/TIME: NORMAL

## 2025-03-18 ENCOUNTER — TELEPHONE (OUTPATIENT)
Age: 30
End: 2025-03-18

## 2025-03-18 NOTE — TELEPHONE ENCOUNTER
T/C made x 2 to the patient with assistance from [facility]  #[id number] to follow-up after the patient's 03-14-25 [place] ED visit.    There was no answer at the patient's listed home/cell phone number and no opportunity to leave a message. Martina Aguilar RN,Nurse Navigator